# Patient Record
Sex: MALE | Race: BLACK OR AFRICAN AMERICAN | Employment: STUDENT | URBAN - METROPOLITAN AREA
[De-identification: names, ages, dates, MRNs, and addresses within clinical notes are randomized per-mention and may not be internally consistent; named-entity substitution may affect disease eponyms.]

---
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
## 2022-11-25 ENCOUNTER — HOSPITAL ENCOUNTER (EMERGENCY)
Age: 22
Discharge: HOME | End: 2022-11-25
Attending: STUDENT IN AN ORGANIZED HEALTH CARE EDUCATION/TRAINING PROGRAM
Payer: COMMERCIAL

## 2022-11-25 VITALS
RESPIRATION RATE: 16 BRPM | OXYGEN SATURATION: 98 % | HEART RATE: 96 BPM | DIASTOLIC BLOOD PRESSURE: 93 MMHG | SYSTOLIC BLOOD PRESSURE: 168 MMHG | TEMPERATURE: 99 F

## 2022-11-25 DIAGNOSIS — J10.1 INFLUENZA A: Primary | ICD-10-CM

## 2022-11-25 LAB
FLUAV AG NPH QL IA: POSITIVE
FLUBV AG NOSE QL IA: NEGATIVE
SARS-COV-2, COV2: NORMAL
SARS-COV-2, XPLCVT: NOT DETECTED
SOURCE, COVRS: NORMAL

## 2022-11-25 PROCEDURE — 99283 EMERGENCY DEPT VISIT LOW MDM: CPT

## 2022-11-25 PROCEDURE — U0005 INFEC AGEN DETEC AMPLI PROBE: HCPCS

## 2022-11-25 PROCEDURE — 87804 INFLUENZA ASSAY W/OPTIC: CPT

## 2022-11-25 RX ORDER — OSELTAMIVIR PHOSPHATE 75 MG/1
75 CAPSULE ORAL 2 TIMES DAILY
Qty: 10 CAPSULE | Refills: 0 | Status: SHIPPED | OUTPATIENT
Start: 2022-11-25 | End: 2022-11-30

## 2022-11-25 RX ORDER — ONDANSETRON 4 MG/1
4 TABLET, ORALLY DISINTEGRATING ORAL
Qty: 12 TABLET | Refills: 0 | Status: SHIPPED | OUTPATIENT
Start: 2022-11-25

## 2022-11-25 NOTE — Clinical Note
[address]  [address] [facility] [address]  [phone]    Work/School Note    Date: 11/25/2022    To Whom It May concern:    Lilibrianne Estrella. was seen and treated today in the emergency room by the following provider(s):  Attending Provider: Latisha Corral MD  Nurse Practitioner: Bessie Valentine NP. Marlo Harrington. is excused from work/school on 11/25/2022 through 11/27/2022. He is medically clear to return to work/school on 11/28/2022.          Sincerely,          Sol Esteves NP

## 2022-11-25 NOTE — DISCHARGE INSTRUCTIONS
Your symptoms are due to Influenza A. In general, this makes you feel unwell from anywhere from a few days to about a week. The treatment is entirely supportive, which includes getting plenty of rest, increasing your fluid intake, and taking over-the-counter medication such as DayQuil/NyQuil, Mucinex, and ibuprofen as needed for your symptoms. Take Tamiflu as prescribed. You can take Zofran if it makes you feel nauseated.   I also recommend using the Debrox eardrops 2-3 times daily to help remove some of the wax from your ear canal.

## 2022-11-25 NOTE — ED PROVIDER NOTES
49-year-old male with past medical history of asthma presents the ER today for evaluation of viral URI-like symptoms, including sinus congestion, nonproductive cough, fatigue, headache, and decreased appetite. Patient denies any wheezing, difficulty breathing, vomiting. He has not had to use his inhaler and he has not taken any OTC medications for relief of his symptoms. Past Medical History:   Diagnosis Date    Asthma        History reviewed. No pertinent surgical history. History reviewed. No pertinent family history. Social History     Socioeconomic History    Marital status: SINGLE     Spouse name: Not on file    Number of children: Not on file    Years of education: Not on file    Highest education level: Not on file   Occupational History    Not on file   Tobacco Use    Smoking status: Never    Smokeless tobacco: Never   Substance and Sexual Activity    Alcohol use: No    Drug use: Not on file    Sexual activity: Not on file   Other Topics Concern    Not on file   Social History Narrative    Not on file     Social Determinants of Health     Financial Resource Strain: Not on file   Food Insecurity: Not on file   Transportation Needs: Not on file   Physical Activity: Not on file   Stress: Not on file   Social Connections: Not on file   Intimate Partner Violence: Not on file   Housing Stability: Not on file         ALLERGIES: Tree nut    Review of Systems   Constitutional:  Positive for chills and fatigue. HENT:  Positive for congestion and sinus pain. Respiratory:  Positive for cough. Neurological:  Positive for headaches. All other systems reviewed and are negative. Vitals:    11/25/22 0819   BP: (!) 168/93   Pulse: 96   Resp: 16   Temp: 99 °F (37.2 °C)   SpO2: 98%            Physical Exam  Vitals and nursing note reviewed. Constitutional:       General: He is not in acute distress. Appearance: Normal appearance. He is not ill-appearing.    HENT:      Head: Normocephalic and atraumatic. Ears:      Comments: Large earwax burden in right ear canal without impaction     Nose: Nose normal.      Mouth/Throat:      Mouth: Mucous membranes are moist.      Pharynx: Oropharynx is clear. Eyes:      Extraocular Movements: Extraocular movements intact. Cardiovascular:      Rate and Rhythm: Normal rate and regular rhythm. Pulses: Normal pulses. Radial pulses are 2+ on the right side and 2+ on the left side. Heart sounds: Normal heart sounds. No murmur heard. No friction rub. Pulmonary:      Effort: Pulmonary effort is normal. No tachypnea. Breath sounds: Normal air entry. No wheezing, rhonchi or rales. Abdominal:      General: Bowel sounds are normal.      Palpations: Abdomen is soft. Musculoskeletal:         General: Normal range of motion. Cervical back: Normal range of motion and neck supple. Right lower leg: No edema. Left lower leg: No edema. Skin:     General: Skin is warm and dry. Neurological:      Mental Status: He is alert and oriented to person, place, and time. Mental status is at baseline. Psychiatric:         Mood and Affect: Mood normal.         Behavior: Behavior normal.        MDM     VITAL SIGNS:  Patient Vitals for the past 4 hrs:   Temp Pulse Resp BP SpO2   11/25/22 0819 99 °F (37.2 °C) 96 16 (!) 168/93 98 %         LABS:  Recent Results (from the past 6 hour(s))   SARS-COV-2    Collection Time: 11/25/22  8:49 AM   Result Value Ref Range    SARS-CoV-2 by PCR Please find results under separate order     INFLUENZA A+B VIRAL AGS    Collection Time: 11/25/22  8:51 AM   Result Value Ref Range    Influenza A Antigen Positive (A) NEG      Influenza B Antigen Negative NEG          IMAGING:  No orders to display         Medications During Visit:  Medications - No data to display      DECISION MAKING:  Ej Hyde is a 25 y.o. male who comes in as above. Influenza A positive.   No wheezing or adventitious lung sounds. No abnormal physical exam findings warranting further medical work-up or treatment. After discussion of the risks and benefits of Tamiflu, the patient opted to receive the antiviral medication seeing as he is tolerated well in the past.  I discussed additional supportive care measures as well as use of Debrox for large right earwax burden. The clinical decision making for this encounter included ordering and interpreting the above diagnostic tests with comparison to prior studies that are within our EMR. Past medical and surgical histories were reviewed, as were records from recent outpatient and emergency department visits. The above results discussed and reviewed with the patient. Patient verbalized understanding of the care plan, including any changes to current outpatient medication regimen, discussed disease process, symptom control, and follow-up care. Return precautions reviewed. IMPRESSION:  1. Influenza A        DISPOSITION:  Discharged      Current Discharge Medication List           Follow-up Information       Follow up With Specialties Details Why Contact Info    Katherine Mike MD Pediatric Medicine  As needed [address]  [phone]                The patient is asked to follow-up with their primary care provider in the next several days. They are to call tomorrow for an appointment. The patient is asked to return promptly for any increased concerns or worsening of symptoms. They can return to this emergency department or any other emergency department.       Procedures